# Patient Record
Sex: FEMALE | Race: WHITE | URBAN - NONMETROPOLITAN AREA
[De-identification: names, ages, dates, MRNs, and addresses within clinical notes are randomized per-mention and may not be internally consistent; named-entity substitution may affect disease eponyms.]

---

## 2020-10-20 ENCOUNTER — OFFICE VISIT (OUTPATIENT)
Dept: URBAN - NONMETROPOLITAN AREA CLINIC 2 | Facility: CLINIC | Age: 60
End: 2020-10-20
Payer: COMMERCIAL

## 2020-10-20 ENCOUNTER — LAB OUTSIDE AN ENCOUNTER (OUTPATIENT)
Dept: URBAN - NONMETROPOLITAN AREA CLINIC 2 | Facility: CLINIC | Age: 60
End: 2020-10-20

## 2020-10-20 DIAGNOSIS — B19.20 HEPATITIS C VIRUS INFECTION WITHOUT HEPATIC COMA, UNSPECIFIED CHRONICITY: ICD-10-CM

## 2020-10-20 DIAGNOSIS — Z12.11 SCREENING FOR COLON CANCER: ICD-10-CM

## 2020-10-20 PROCEDURE — G8427 DOCREV CUR MEDS BY ELIG CLIN: HCPCS | Performed by: INTERNAL MEDICINE

## 2020-10-20 PROCEDURE — 3017F COLORECTAL CA SCREEN DOC REV: CPT | Performed by: INTERNAL MEDICINE

## 2020-10-20 PROCEDURE — G9902 PT SCRN TBCO AND ID AS USER: HCPCS | Performed by: INTERNAL MEDICINE

## 2020-10-20 PROCEDURE — 80074 ACUTE HEPATITIS PANEL: CPT | Performed by: INTERNAL MEDICINE

## 2020-10-20 PROCEDURE — 99203 OFFICE O/P NEW LOW 30 MIN: CPT | Performed by: INTERNAL MEDICINE

## 2020-10-20 PROCEDURE — 87522 HEPATITIS C REVRS TRNSCRPJ: CPT | Performed by: INTERNAL MEDICINE

## 2020-10-20 RX ORDER — TRAZODONE HYDROCHLORIDE 100 MG/1
TAKE 1 TABLET (100 MG) BY ORAL ROUTE ONCE DAILY AT BEDTIME TABLET, FILM COATED ORAL 1
Qty: 0 | Refills: 0 | Status: ACTIVE | COMMUNITY
Start: 1900-01-01

## 2020-10-20 RX ORDER — OMEPRAZOLE 20 MG/1
TAKE 1 CAPSULE (20 MG) BY ORAL ROUTE ONCE DAILY BEFORE A MEAL CAPSULE, DELAYED RELEASE ORAL 1
Qty: 0 | Refills: 0 | Status: ACTIVE | COMMUNITY
Start: 1900-01-01

## 2020-10-20 RX ORDER — EPHEDRINE HCL, GUAIFENESIN 12.5; 2 MG/1; MG/1
TAKE 1 TABLET BY ORAL ROUTE DAILY TABLET ORAL 1
Qty: 0 | Refills: 0 | Status: ACTIVE | COMMUNITY
Start: 1900-01-01

## 2020-10-20 RX ORDER — ATENOLOL 25 MG/1
TAKE 1 TABLET (25 MG) BY ORAL ROUTE ONCE DAILY TABLET ORAL 1
Qty: 0 | Refills: 0 | Status: ACTIVE | COMMUNITY
Start: 1900-01-01

## 2020-10-20 RX ORDER — FLUTICASONE PROPIONATE 50 UG/1
SPRAY, METERED NASAL
Qty: 0 | Refills: 0 | Status: ACTIVE | COMMUNITY
Start: 1900-01-01

## 2020-10-20 RX ORDER — HYDROCODONE/ACETAMINOPHEN 10MG-325MG
TAKE 1 TABLET BY ORAL ROUTE EVERY 6 HOURS AS NEEDED FOR PAIN TABLET ORAL
Qty: 0 | Refills: 0 | Status: ACTIVE | COMMUNITY
Start: 1900-01-01

## 2020-10-20 RX ORDER — ASPIRIN 81 MG/1
CHEW 1 TABLET (81 MG) BY ORAL ROUTE ONCE DAILY TABLET, CHEWABLE ORAL 1
Qty: 0 | Refills: 0 | Status: ACTIVE | COMMUNITY
Start: 1900-01-01

## 2020-10-20 RX ORDER — DIPHENHYDRAMINE HCL 2 %
TAKE 2 CAPSULES (50 MG) BY ORAL ROUTE ONCE DAILY AT BEDTIME AS NEEDED CREAM (GRAM) TOPICAL 1
Qty: 0 | Refills: 0 | Status: ACTIVE | COMMUNITY
Start: 1900-01-01

## 2020-10-20 NOTE — HPI-TODAY'S VISIT:
The patient is a 60-year-old female who presents today for hepatitis C.  She was diagnosed with hepatitis C by her primary care physician.  She does have a history of hepatitis B, she states about 40 years ago she did have jaundice and was diagnosed with hepatitis B.  She has not had any problems with her liver that she knows of since that time.  She does drink alcohol occasionally, and she does smoke a pack of cigarettes per day.  She has done IV drugs in the past, but this has been quite some time.  She is currently on disability secondary to her back.  She has never had a colonoscopy.  She denies any problems with her bowels.  She has not seen any blood in her stool.  We have had a long discussion today about hepatitis C and screening for colon cancer.

## 2020-10-20 NOTE — PHYSICAL EXAM GASTROINTESTINAL
Abdomen , soft, nontender, nondistended , normal bowel sounds Spontaneous, unlabored and symmetrical

## 2020-10-22 LAB
ALBUMIN: 4.3
ALKALINE PHOSPHATASE: 87
ALT (SGPT): 28
AST (SGOT): 68
BASO (ABSOLUTE): 0
BASOS: 0
BILIRUBIN, DIRECT: 0.18
BILIRUBIN, TOTAL: 0.4
BUN/CREATININE RATIO: 9
BUN: 5
CARBON DIOXIDE, TOTAL: 22
CHLORIDE: 91
CREATININE: 0.55
EGFR IF AFRICN AM: 117
EGFR IF NONAFRICN AM: 102
EOS (ABSOLUTE): 0
EOS: 0
GLUCOSE: 74
HBSAG SCREEN: NEGATIVE
HCV GENOTYPE: (no result)
HCV LOG10: 6.66
HCV LOG10: 6.66
HEMATOCRIT: 35.9
HEMATOLOGY COMMENTS:: (no result)
HEMOGLOBIN: 12.2
HEP A AB, IGM: NEGATIVE
HEP B CORE AB, IGM: NEGATIVE
HEP B CORE AB, TOT: POSITIVE
HEP C VIRUS AB: >11
HEPATITIS C GENOTYPE: (no result)
HEPATITIS C QUANTITATION: (no result)
HEPATITIS C QUANTITATION: (no result)
IMMATURE CELLS: (no result)
IMMATURE GRANS (ABS): 0
IMMATURE GRANULOCYTES: 0
LYMPHS (ABSOLUTE): 0.6
LYMPHS: 10
Lab: (no result)
Lab: (no result)
MCH: 35.5
MCHC: 34
MCV: 104
MONOCYTES(ABSOLUTE): 0.6
MONOCYTES: 10
NEUTROPHILS (ABSOLUTE): 4.9
NEUTROPHILS: 80
NRBC: (no result)
PLATELETS: 489
POTASSIUM: 5
PROTEIN, TOTAL: 7.6
RBC: 3.44
RDW: 12.2
SODIUM: 130
TEST INFORMATION:: (no result)
TEST INFORMATION:: (no result)
WBC: 6.2

## 2020-10-28 ENCOUNTER — TELEPHONE ENCOUNTER (OUTPATIENT)
Dept: URBAN - METROPOLITAN AREA CLINIC 92 | Facility: CLINIC | Age: 60
End: 2020-10-28

## 2021-01-19 ENCOUNTER — OFFICE VISIT (OUTPATIENT)
Dept: URBAN - NONMETROPOLITAN AREA CLINIC 2 | Facility: CLINIC | Age: 61
End: 2021-01-19

## 2021-03-04 ENCOUNTER — OFFICE VISIT (OUTPATIENT)
Dept: URBAN - NONMETROPOLITAN AREA CLINIC 2 | Facility: CLINIC | Age: 61
End: 2021-03-04

## 2021-03-16 ENCOUNTER — OFFICE VISIT (OUTPATIENT)
Dept: URBAN - NONMETROPOLITAN AREA CLINIC 2 | Facility: CLINIC | Age: 61
End: 2021-03-16

## 2021-04-12 ENCOUNTER — TELEPHONE ENCOUNTER (OUTPATIENT)
Dept: URBAN - METROPOLITAN AREA CLINIC 92 | Facility: CLINIC | Age: 61
End: 2021-04-12

## 2021-04-12 RX ORDER — VELPATASVIR AND SOFOSBUVIR 100; 400 MG/1; MG/1
1 TABLET TABLET, FILM COATED ORAL ONCE A DAY
Qty: 30 TABLET | Refills: 2 | OUTPATIENT
Start: 2021-04-20 | End: 2021-07-19

## 2021-04-12 RX ORDER — TRAZODONE HYDROCHLORIDE 100 MG/1
TAKE 1 TABLET (100 MG) BY ORAL ROUTE ONCE DAILY AT BEDTIME TABLET, FILM COATED ORAL 1
Qty: 0 | Refills: 0 | Status: ACTIVE | COMMUNITY
Start: 1900-01-01

## 2021-04-12 RX ORDER — DIPHENHYDRAMINE HCL 2 %
TAKE 2 CAPSULES (50 MG) BY ORAL ROUTE ONCE DAILY AT BEDTIME AS NEEDED CREAM (GRAM) TOPICAL 1
Qty: 0 | Refills: 0 | Status: ACTIVE | COMMUNITY
Start: 1900-01-01

## 2021-04-12 RX ORDER — OMEPRAZOLE 20 MG/1
TAKE 1 CAPSULE (20 MG) BY ORAL ROUTE ONCE DAILY BEFORE A MEAL CAPSULE, DELAYED RELEASE ORAL 1
Qty: 0 | Refills: 0 | Status: ACTIVE | COMMUNITY
Start: 1900-01-01

## 2021-04-12 RX ORDER — FLUTICASONE PROPIONATE 50 UG/1
SPRAY, METERED NASAL
Qty: 0 | Refills: 0 | Status: ACTIVE | COMMUNITY
Start: 1900-01-01

## 2021-04-12 RX ORDER — ATENOLOL 25 MG/1
TAKE 1 TABLET (25 MG) BY ORAL ROUTE ONCE DAILY TABLET ORAL 1
Qty: 0 | Refills: 0 | Status: ACTIVE | COMMUNITY
Start: 1900-01-01

## 2021-04-12 RX ORDER — EPHEDRINE HCL, GUAIFENESIN 12.5; 2 MG/1; MG/1
TAKE 1 TABLET BY ORAL ROUTE DAILY TABLET ORAL 1
Qty: 0 | Refills: 0 | Status: ACTIVE | COMMUNITY
Start: 1900-01-01

## 2021-04-12 RX ORDER — HYDROCODONE/ACETAMINOPHEN 10MG-325MG
TAKE 1 TABLET BY ORAL ROUTE EVERY 6 HOURS AS NEEDED FOR PAIN TABLET ORAL
Qty: 0 | Refills: 0 | Status: ACTIVE | COMMUNITY
Start: 1900-01-01

## 2021-04-12 RX ORDER — ASPIRIN 81 MG/1
CHEW 1 TABLET (81 MG) BY ORAL ROUTE ONCE DAILY TABLET, CHEWABLE ORAL 1
Qty: 0 | Refills: 0 | Status: ACTIVE | COMMUNITY
Start: 1900-01-01

## 2021-05-11 ENCOUNTER — OFFICE VISIT (OUTPATIENT)
Dept: URBAN - NONMETROPOLITAN AREA CLINIC 2 | Facility: CLINIC | Age: 61
End: 2021-05-11
Payer: COMMERCIAL

## 2021-05-11 ENCOUNTER — WEB ENCOUNTER (OUTPATIENT)
Dept: URBAN - NONMETROPOLITAN AREA CLINIC 2 | Facility: CLINIC | Age: 61
End: 2021-05-11

## 2021-05-11 VITALS
HEIGHT: 62 IN | HEART RATE: 103 BPM | WEIGHT: 96 LBS | TEMPERATURE: 96.9 F | SYSTOLIC BLOOD PRESSURE: 126 MMHG | BODY MASS INDEX: 17.66 KG/M2 | DIASTOLIC BLOOD PRESSURE: 83 MMHG

## 2021-05-11 DIAGNOSIS — B19.20 HEPATITIS C VIRUS INFECTION WITHOUT HEPATIC COMA, UNSPECIFIED CHRONICITY: ICD-10-CM

## 2021-05-11 DIAGNOSIS — B19.10 VIRAL HEPATITIS B WITH HEPATITIS DELTA, WITHOUT HEPATIC COMA, UNSPECIFIED CHRONICITY: ICD-10-CM

## 2021-05-11 DIAGNOSIS — Z12.11 SCREENING FOR COLON CANCER: ICD-10-CM

## 2021-05-11 DIAGNOSIS — N04.9 NEPHROTIC SYNDROME: ICD-10-CM

## 2021-05-11 PROCEDURE — 99214 OFFICE O/P EST MOD 30 MIN: CPT | Performed by: INTERNAL MEDICINE

## 2021-05-11 RX ORDER — FLUTICASONE PROPIONATE 50 UG/1
SPRAY, METERED NASAL
Qty: 0 | Refills: 0 | Status: ACTIVE | COMMUNITY
Start: 1900-01-01

## 2021-05-11 RX ORDER — OMEPRAZOLE 20 MG/1
TAKE 1 CAPSULE (20 MG) BY ORAL ROUTE ONCE DAILY BEFORE A MEAL CAPSULE, DELAYED RELEASE ORAL 1
Qty: 0 | Refills: 0 | Status: ACTIVE | COMMUNITY
Start: 1900-01-01

## 2021-05-11 RX ORDER — VELPATASVIR AND SOFOSBUVIR 100; 400 MG/1; MG/1
1 TABLET TABLET, FILM COATED ORAL ONCE A DAY
Qty: 30 TABLET | Refills: 2 | Status: ACTIVE | COMMUNITY
Start: 2021-04-20 | End: 2021-07-19

## 2021-05-11 RX ORDER — TRAZODONE HYDROCHLORIDE 100 MG/1
TAKE 1 TABLET (100 MG) BY ORAL ROUTE ONCE DAILY AT BEDTIME TABLET, FILM COATED ORAL 1
Qty: 0 | Refills: 0 | Status: ACTIVE | COMMUNITY
Start: 1900-01-01

## 2021-05-11 RX ORDER — EPHEDRINE HCL, GUAIFENESIN 12.5; 2 MG/1; MG/1
TAKE 1 TABLET BY ORAL ROUTE DAILY TABLET ORAL 1
Qty: 0 | Refills: 0 | Status: ACTIVE | COMMUNITY
Start: 1900-01-01

## 2021-05-11 RX ORDER — HYDROCODONE/ACETAMINOPHEN 10MG-325MG
TAKE 1 TABLET BY ORAL ROUTE EVERY 6 HOURS AS NEEDED FOR PAIN TABLET ORAL
Qty: 0 | Refills: 0 | Status: ACTIVE | COMMUNITY
Start: 1900-01-01

## 2021-05-11 RX ORDER — ATENOLOL 25 MG/1
TAKE 1 TABLET (25 MG) BY ORAL ROUTE ONCE DAILY TABLET ORAL 1
Qty: 0 | Refills: 0 | Status: ACTIVE | COMMUNITY
Start: 1900-01-01

## 2021-05-11 RX ORDER — ASPIRIN 81 MG/1
CHEW 1 TABLET (81 MG) BY ORAL ROUTE ONCE DAILY TABLET, CHEWABLE ORAL 1
Qty: 0 | Refills: 0 | Status: ACTIVE | COMMUNITY
Start: 1900-01-01

## 2021-05-11 RX ORDER — DIPHENHYDRAMINE HCL 2 %
TAKE 2 CAPSULES (50 MG) BY ORAL ROUTE ONCE DAILY AT BEDTIME AS NEEDED CREAM (GRAM) TOPICAL 1
Qty: 0 | Refills: 0 | Status: ACTIVE | COMMUNITY
Start: 1900-01-01

## 2021-05-11 NOTE — HPI-TODAY'S VISIT:
The patient is a 60-year-old female who presents today for hepatitis C.  She was diagnosed with hepatitis C by her primary care physician.  She does have a history of hepatitis B, she states about 40 years ago she did have jaundice and was diagnosed with hepatitis B.  She has not had any problems with her liver that she knows of since that time.  She does drink alcohol occasionally, and she does smoke a pack of cigarettes per day.  She has done IV drugs in the past, but this has been quite some time.  She is currently on disability secondary to her back.  She has never had a colonoscopy.  She denies any problems with her bowels.  She has not seen any blood in her stool.  We have had a long discussion today about hepatitis C and screening for colon cancer.  5/11/2021 Mrs. Maurer presents for [facility] follow-up.  She was admitted with nephrotic syndrome, she had severe abnormalities of her electrolytes and is following closely with nephrology.  Her viral load is positive for hepatitis C and she is genotype 1a.  Her viral load for hepatitis B is negative, she did have acute hep B over 40 years ago.  We have discussed therapy with Epclusa for 12 weeks.  However given her acute kidney injury and electrolyte imbalance we have decided to defer therapy until later this summer.  We will get records from Dr. Dill and consider Epclusa for 12 weeks so long as she has significant improvement in her renal function.  MB

## 2021-05-16 LAB
A/G RATIO: 1.3
ALBUMIN: 3.9
ALKALINE PHOSPHATASE: 82
ALT (SGPT): 20
AST (SGOT): 32
BASO (ABSOLUTE): 0.1
BASOS: 0
BILIRUBIN, TOTAL: 0.2
BUN/CREATININE RATIO: 18
BUN: 9
CALCIUM: 9.1
CARBON DIOXIDE, TOTAL: 19
CHLORIDE: 94
CREATININE: 0.5
EGFR IF AFRICN AM: 120
EGFR IF NONAFRICN AM: 104
EOS (ABSOLUTE): 0
EOS: 0
GLOBULIN, TOTAL: 2.9
GLUCOSE: 77
HBSAG SCREEN: NEGATIVE
HBV LOG10: (no result)
HCV AB: >11
HCV LOG10: 6.09
HEMATOCRIT: 26.6
HEMATOLOGY COMMENTS:: (no result)
HEMOGLOBIN: 8.6
HEP A AB, IGM: NEGATIVE
HEP A AB, TOTAL: POSITIVE
HEP B CORE AB, IGM: NEGATIVE
HEP B CORE AB, TOT: POSITIVE
HEP BE AB: NEGATIVE
HEP BE AG: NEGATIVE
HEP C VIRUS AB: >11
HEPATITIS B QUANTITATION: (no result)
HEPATITIS B SURF AB QUANT: 337.6
HEPATITIS C QUANTITATION: (no result)
IMMATURE CELLS: (no result)
IMMATURE GRANS (ABS): 0
IMMATURE GRANULOCYTES: 0
INTERPRETATION:: (no result)
LYMPHS (ABSOLUTE): 0.8
LYMPHS: 7
MCH: 33.2
MCHC: 32.3
MCV: 103
MONOCYTES(ABSOLUTE): 1.4
MONOCYTES: 11
NEUTROPHILS (ABSOLUTE): 9.8
NEUTROPHILS: 82
NRBC: (no result)
PLATELETS: 441
POTASSIUM: 5.4
PROTEIN, TOTAL: 6.8
RBC: 2.59
RDW: 13.2
SODIUM: 129
TEST INFORMATION:: (no result)
TEST INFORMATION:: (no result)
WBC: 12.1

## 2021-05-17 ENCOUNTER — TELEPHONE ENCOUNTER (OUTPATIENT)
Dept: URBAN - METROPOLITAN AREA CLINIC 92 | Facility: CLINIC | Age: 61
End: 2021-05-17

## 2021-05-17 RX ORDER — ASPIRIN 81 MG/1
CHEW 1 TABLET (81 MG) BY ORAL ROUTE ONCE DAILY TABLET, CHEWABLE ORAL 1
Qty: 0 | Refills: 0 | Status: ACTIVE | COMMUNITY
Start: 1900-01-01

## 2021-05-17 RX ORDER — HYDROCODONE/ACETAMINOPHEN 10MG-325MG
TAKE 1 TABLET BY ORAL ROUTE EVERY 6 HOURS AS NEEDED FOR PAIN TABLET ORAL
Qty: 0 | Refills: 0 | Status: ACTIVE | COMMUNITY
Start: 1900-01-01

## 2021-05-17 RX ORDER — DIPHENHYDRAMINE HCL 2 %
TAKE 2 CAPSULES (50 MG) BY ORAL ROUTE ONCE DAILY AT BEDTIME AS NEEDED CREAM (GRAM) TOPICAL 1
Qty: 0 | Refills: 0 | Status: ACTIVE | COMMUNITY
Start: 1900-01-01

## 2021-05-17 RX ORDER — EPHEDRINE HCL, GUAIFENESIN 12.5; 2 MG/1; MG/1
TAKE 1 TABLET BY ORAL ROUTE DAILY TABLET ORAL 1
Qty: 0 | Refills: 0 | Status: ACTIVE | COMMUNITY
Start: 1900-01-01

## 2021-05-17 RX ORDER — VELPATASVIR AND SOFOSBUVIR 100; 400 MG/1; MG/1
1 TABLET TABLET, FILM COATED ORAL ONCE A DAY
Qty: 30 TABLET | Refills: 2 | Status: ACTIVE | COMMUNITY
Start: 2021-04-20 | End: 2021-07-19

## 2021-05-17 RX ORDER — TRAZODONE HYDROCHLORIDE 100 MG/1
TAKE 1 TABLET (100 MG) BY ORAL ROUTE ONCE DAILY AT BEDTIME TABLET, FILM COATED ORAL 1
Qty: 0 | Refills: 0 | Status: ACTIVE | COMMUNITY
Start: 1900-01-01

## 2021-05-17 RX ORDER — POLYETHYLENE GLYCOL 3350, SODIUM SULFATE ANHYDROUS, SODIUM BICARBONATE, SODIUM CHLORIDE, POTASSIUM CHLORIDE 236; 22.74; 6.74; 5.86; 2.97 G/4L; G/4L; G/4L; G/4L; G/4L
AS DIRECTED POWDER, FOR SOLUTION ORAL ONCE
Qty: 1 GALLON | Refills: 0 | OUTPATIENT
Start: 2021-05-19 | End: 2021-05-20

## 2021-05-17 RX ORDER — OMEPRAZOLE 20 MG/1
TAKE 1 CAPSULE (20 MG) BY ORAL ROUTE ONCE DAILY BEFORE A MEAL CAPSULE, DELAYED RELEASE ORAL 1
Qty: 0 | Refills: 0 | Status: ACTIVE | COMMUNITY
Start: 1900-01-01

## 2021-05-17 RX ORDER — ATENOLOL 25 MG/1
TAKE 1 TABLET (25 MG) BY ORAL ROUTE ONCE DAILY TABLET ORAL 1
Qty: 0 | Refills: 0 | Status: ACTIVE | COMMUNITY
Start: 1900-01-01

## 2021-05-17 RX ORDER — FLUTICASONE PROPIONATE 50 UG/1
SPRAY, METERED NASAL
Qty: 0 | Refills: 0 | Status: ACTIVE | COMMUNITY
Start: 1900-01-01

## 2021-05-19 ENCOUNTER — TELEPHONE ENCOUNTER (OUTPATIENT)
Dept: URBAN - NONMETROPOLITAN AREA CLINIC 2 | Facility: CLINIC | Age: 61
End: 2021-05-19

## 2021-05-19 ENCOUNTER — LAB OUTSIDE AN ENCOUNTER (OUTPATIENT)
Dept: URBAN - METROPOLITAN AREA CLINIC 92 | Facility: CLINIC | Age: 61
End: 2021-05-19

## 2021-05-19 RX ORDER — OMEPRAZOLE 20 MG/1
TAKE 1 CAPSULE (20 MG) BY ORAL ROUTE ONCE DAILY BEFORE A MEAL CAPSULE, DELAYED RELEASE ORAL 1
Qty: 0 | Refills: 0 | Status: ACTIVE | COMMUNITY
Start: 1900-01-01

## 2021-05-19 RX ORDER — POLYETHYLENE GLYCOL 3350, SODIUM SULFATE, SODIUM CHLORIDE, POTASSIUM CHLORIDE, ASCORBIC ACID, SODIUM ASCORBATE 7.5-2.691G
AS DIRECTED KIT ORAL ONCE
Qty: 1 BOX | Refills: 0 | OUTPATIENT
Start: 2021-05-20 | End: 2021-05-21

## 2021-05-19 RX ORDER — ASPIRIN 81 MG/1
CHEW 1 TABLET (81 MG) BY ORAL ROUTE ONCE DAILY TABLET, CHEWABLE ORAL 1
Qty: 0 | Refills: 0 | Status: ACTIVE | COMMUNITY
Start: 1900-01-01

## 2021-05-19 RX ORDER — DIPHENHYDRAMINE HCL 2 %
TAKE 2 CAPSULES (50 MG) BY ORAL ROUTE ONCE DAILY AT BEDTIME AS NEEDED CREAM (GRAM) TOPICAL 1
Qty: 0 | Refills: 0 | Status: ACTIVE | COMMUNITY
Start: 1900-01-01

## 2021-05-19 RX ORDER — FLUTICASONE PROPIONATE 50 UG/1
SPRAY, METERED NASAL
Qty: 0 | Refills: 0 | Status: ACTIVE | COMMUNITY
Start: 1900-01-01

## 2021-05-19 RX ORDER — HYDROCODONE/ACETAMINOPHEN 10MG-325MG
TAKE 1 TABLET BY ORAL ROUTE EVERY 6 HOURS AS NEEDED FOR PAIN TABLET ORAL
Qty: 0 | Refills: 0 | Status: ACTIVE | COMMUNITY
Start: 1900-01-01

## 2021-05-19 RX ORDER — EPHEDRINE HCL, GUAIFENESIN 12.5; 2 MG/1; MG/1
TAKE 1 TABLET BY ORAL ROUTE DAILY TABLET ORAL 1
Qty: 0 | Refills: 0 | Status: ACTIVE | COMMUNITY
Start: 1900-01-01

## 2021-05-19 RX ORDER — POLYETHYLENE GLYCOL 3350, SODIUM SULFATE ANHYDROUS, SODIUM BICARBONATE, SODIUM CHLORIDE, POTASSIUM CHLORIDE 236; 22.74; 6.74; 5.86; 2.97 G/4L; G/4L; G/4L; G/4L; G/4L
AS DIRECTED POWDER, FOR SOLUTION ORAL ONCE
Qty: 1 GALLON | Refills: 0 | Status: ACTIVE | COMMUNITY
Start: 2021-05-19 | End: 2021-05-20

## 2021-05-19 RX ORDER — TRAZODONE HYDROCHLORIDE 100 MG/1
TAKE 1 TABLET (100 MG) BY ORAL ROUTE ONCE DAILY AT BEDTIME TABLET, FILM COATED ORAL 1
Qty: 0 | Refills: 0 | Status: ACTIVE | COMMUNITY
Start: 1900-01-01

## 2021-05-19 RX ORDER — ATENOLOL 25 MG/1
TAKE 1 TABLET (25 MG) BY ORAL ROUTE ONCE DAILY TABLET ORAL 1
Qty: 0 | Refills: 0 | Status: ACTIVE | COMMUNITY
Start: 1900-01-01

## 2021-05-19 RX ORDER — VELPATASVIR AND SOFOSBUVIR 100; 400 MG/1; MG/1
1 TABLET TABLET, FILM COATED ORAL ONCE A DAY
Qty: 30 TABLET | Refills: 2 | Status: ACTIVE | COMMUNITY
Start: 2021-04-20 | End: 2021-07-19

## 2021-07-08 ENCOUNTER — TELEPHONE ENCOUNTER (OUTPATIENT)
Dept: URBAN - NONMETROPOLITAN AREA CLINIC 2 | Facility: CLINIC | Age: 61
End: 2021-07-08

## 2021-07-15 ENCOUNTER — OFFICE VISIT (OUTPATIENT)
Dept: URBAN - METROPOLITAN AREA MEDICAL CENTER 1 | Facility: MEDICAL CENTER | Age: 61
End: 2021-07-15
Payer: COMMERCIAL

## 2021-07-15 DIAGNOSIS — Z12.11 COLON CANCER SCREENING: ICD-10-CM

## 2021-07-15 PROCEDURE — 45378 DIAGNOSTIC COLONOSCOPY: CPT | Performed by: INTERNAL MEDICINE

## 2021-08-10 ENCOUNTER — OFFICE VISIT (OUTPATIENT)
Dept: URBAN - NONMETROPOLITAN AREA CLINIC 2 | Facility: CLINIC | Age: 61
End: 2021-08-10

## 2021-08-11 ENCOUNTER — DASHBOARD ENCOUNTERS (OUTPATIENT)
Age: 61
End: 2021-08-11

## 2021-08-11 ENCOUNTER — OFFICE VISIT (OUTPATIENT)
Dept: URBAN - NONMETROPOLITAN AREA CLINIC 2 | Facility: CLINIC | Age: 61
End: 2021-08-11
Payer: COMMERCIAL

## 2021-08-11 ENCOUNTER — LAB OUTSIDE AN ENCOUNTER (OUTPATIENT)
Dept: URBAN - NONMETROPOLITAN AREA CLINIC 2 | Facility: CLINIC | Age: 61
End: 2021-08-11

## 2021-08-11 DIAGNOSIS — B18.2 CHRONIC HEPATITIS C: ICD-10-CM

## 2021-08-11 DIAGNOSIS — N04.9 NEPHROTIC SYNDROME: ICD-10-CM

## 2021-08-11 DIAGNOSIS — D50.8 OTHER IRON DEFICIENCY ANEMIA: ICD-10-CM

## 2021-08-11 DIAGNOSIS — B18.1 CHRONIC HEPATITIS B: ICD-10-CM

## 2021-08-11 PROBLEM — 52254009: Status: ACTIVE | Noted: 2021-05-11

## 2021-08-11 PROBLEM — 50711007: Status: ACTIVE | Noted: 2020-10-20

## 2021-08-11 PROBLEM — 87522002: Status: ACTIVE | Noted: 2021-05-19

## 2021-08-11 PROBLEM — 111891008: Status: ACTIVE | Noted: 2021-05-11

## 2021-08-11 PROBLEM — 305058001: Status: ACTIVE | Noted: 2020-10-20

## 2021-08-11 PROCEDURE — 99214 OFFICE O/P EST MOD 30 MIN: CPT | Performed by: NURSE PRACTITIONER

## 2021-08-11 RX ORDER — OMEPRAZOLE 20 MG/1
TAKE 1 CAPSULE (20 MG) BY ORAL ROUTE ONCE DAILY BEFORE A MEAL CAPSULE, DELAYED RELEASE ORAL 1
Qty: 0 | Refills: 0 | Status: ACTIVE | COMMUNITY
Start: 1900-01-01

## 2021-08-11 RX ORDER — ATENOLOL 25 MG/1
TAKE 1 TABLET (25 MG) BY ORAL ROUTE ONCE DAILY TABLET ORAL 1
Qty: 0 | Refills: 0 | Status: ACTIVE | COMMUNITY
Start: 1900-01-01

## 2021-08-11 RX ORDER — DIPHENHYDRAMINE HCL 2 %
TAKE 2 CAPSULES (50 MG) BY ORAL ROUTE ONCE DAILY AT BEDTIME AS NEEDED CREAM (GRAM) TOPICAL 1
Qty: 0 | Refills: 0 | Status: ACTIVE | COMMUNITY
Start: 1900-01-01

## 2021-08-11 RX ORDER — HYDROCODONE/ACETAMINOPHEN 10MG-325MG
TAKE 1 TABLET BY ORAL ROUTE EVERY 6 HOURS AS NEEDED FOR PAIN TABLET ORAL
Qty: 0 | Refills: 0 | Status: ACTIVE | COMMUNITY
Start: 1900-01-01

## 2021-08-11 RX ORDER — TRAZODONE HYDROCHLORIDE 100 MG/1
TAKE 1 TABLET (100 MG) BY ORAL ROUTE ONCE DAILY AT BEDTIME TABLET, FILM COATED ORAL 1
Qty: 0 | Refills: 0 | Status: ACTIVE | COMMUNITY
Start: 1900-01-01

## 2021-08-11 RX ORDER — ASPIRIN 81 MG/1
CHEW 1 TABLET (81 MG) BY ORAL ROUTE ONCE DAILY TABLET, CHEWABLE ORAL 1
Qty: 0 | Refills: 0 | Status: ACTIVE | COMMUNITY
Start: 1900-01-01

## 2021-08-11 RX ORDER — FLUTICASONE PROPIONATE 50 UG/1
SPRAY, METERED NASAL
Qty: 0 | Refills: 0 | Status: ACTIVE | COMMUNITY
Start: 1900-01-01

## 2021-08-11 RX ORDER — EPHEDRINE HCL, GUAIFENESIN 12.5; 2 MG/1; MG/1
TAKE 1 TABLET BY ORAL ROUTE DAILY TABLET ORAL 1
Qty: 0 | Refills: 0 | Status: ACTIVE | COMMUNITY
Start: 1900-01-01

## 2021-08-11 NOTE — HPI-TODAY'S VISIT:
The patient is a 60-year-old female who presents today for hepatitis C.  She was diagnosed with hepatitis C by her primary care physician.  She does have a history of hepatitis B, she states about 40 years ago she did have jaundice and was diagnosed with hepatitis B.  She has not had any problems with her liver that she knows of since that time.  She does drink alcohol occasionally, and she does smoke a pack of cigarettes per day.  She has done IV drugs in the past, but this has been quite some time.  She is currently on disability secondary to her back.  She has never had a colonoscopy.  She denies any problems with her bowels.  She has not seen any blood in her stool.  We have had a long discussion today about hepatitis C and screening for colon cancer.  5/11/2021 Mrs. Maurer presents for [facility] follow-up.  She was admitted with nephrotic syndrome, she had severe abnormalities of her electrolytes and is following closely with nephrology.  Her viral load is positive for hepatitis C and she is genotype 1a.  Her viral load for hepatitis B is negative, she did have acute hep B over 40 years ago.  We have discussed therapy with Epclusa for 12 weeks.  However given her acute kidney injury and electrolyte imbalance we have decided to defer therapy until later this summer.  We will get records from Dr. Dill and consider Epclusa for 12 weeks so long as she has significant improvement in her renal function.  MB   8/11/2021 Mrs. Maurer presents for follow-up of hepatitis C genotype 1a noncirrhotic treatment naive, distant history of hepatitis B with negative viral load, nephrotic syndrome, and anemia.  Since her last visit she is status post colonoscopy which was normal.  She continues to follow with  but we do not have these records.  She has been diagnosed with nephrotic syndrome.  She does have a likely component of CKD, and is on multiple doses of diuretics daily.  Her electrolytes continue to waver.  Today we have had a long discussion regarding her renal disease, history of hepatitis B with a positive core antibody, and hepatitis C.  She does want to pursue treatment.  She agrees to be evaluated by hepatology.  MB

## 2021-08-15 LAB
A/G RATIO: 1.2
ALBUMIN: 4.6
ALKALINE PHOSPHATASE: 174
ALT (SGPT): 27
AST (SGOT): 70
BASO (ABSOLUTE): 0.1
BASOS: 1
BILIRUBIN, TOTAL: 0.3
BUN/CREATININE RATIO: 15
BUN: 11
CALCIUM: 10.1
CARBON DIOXIDE, TOTAL: 25
CHLORIDE: 87
CREATININE: 0.72
EGFR IF AFRICN AM: 104
EGFR IF NONAFRICN AM: 90
EOS (ABSOLUTE): 0.2
EOS: 2
GLOBULIN, TOTAL: 4
GLUCOSE: 95
HBSAG SCREEN: NEGATIVE
HBV LOG10: (no result)
HEMATOCRIT: 38.9
HEMATOLOGY COMMENTS:: (no result)
HEMOGLOBIN: 13.1
HEP B CORE AB, TOT: POSITIVE
HEP BE AB: NEGATIVE
HEP BE AG: NEGATIVE
HEPATITIS B QUANTITATION: (no result)
HEPATITIS B SURF AB QUANT: 314.7
IMMATURE CELLS: (no result)
IMMATURE GRANS (ABS): 0
IMMATURE GRANULOCYTES: 0
INR: 0.9
LYMPHS (ABSOLUTE): 0.8
LYMPHS: 11
MCH: 34.8
MCHC: 33.7
MCV: 104
MONOCYTES(ABSOLUTE): 1
MONOCYTES: 14
NEUTROPHILS (ABSOLUTE): 5.1
NEUTROPHILS: 72
NRBC: (no result)
PLATELETS: 577
POTASSIUM: 4.2
PROTEIN, TOTAL: 8.6
PROTHROMBIN TIME: 9.8
RBC: 3.76
RDW: 12
SODIUM: 132
TEST INFORMATION:: (no result)
WBC: 7.1

## 2021-09-16 ENCOUNTER — LAB OUTSIDE AN ENCOUNTER (OUTPATIENT)
Dept: URBAN - NONMETROPOLITAN AREA CLINIC 2 | Facility: CLINIC | Age: 61
End: 2021-09-16

## 2021-09-26 LAB
A/G RATIO: 0.9
ALBUMIN: 3.5
ALKALINE PHOSPHATASE: 175
ALT (SGPT): 28
AST (SGOT): 59
BASO (ABSOLUTE): 0.1
BASOS: 1
BILIRUBIN, TOTAL: <0.2
BUN/CREATININE RATIO: 11
BUN: 9
CALCIUM: 9.5
CARBON DIOXIDE, TOTAL: 22
CHLORIDE: 94
CREATININE: 0.82
EGFR IF AFRICN AM: 89
EGFR IF NONAFRICN AM: 77
EOS (ABSOLUTE): 0.2
EOS: 2
GLOBULIN, TOTAL: 3.7
GLUCOSE: 103
HBSAG SCREEN: NEGATIVE
HBV LOG10: (no result)
HEMATOCRIT: 38.5
HEMATOLOGY COMMENTS:: (no result)
HEMOGLOBIN: 13.1
HEP B CORE AB, TOT: POSITIVE
HEP BE AB: NEGATIVE
HEP BE AG: NEGATIVE
HEPATITIS B QUANTITATION: (no result)
HEPATITIS B SURF AB QUANT: 236.7
IMMATURE CELLS: (no result)
IMMATURE GRANS (ABS): 0
IMMATURE GRANULOCYTES: 0
INR: 0.9
LYMPHS (ABSOLUTE): 1.2
LYMPHS: 11
MCH: 34.1
MCHC: 34
MCV: 100
MONOCYTES(ABSOLUTE): 1
MONOCYTES: 9
NEUTROPHILS (ABSOLUTE): 8.4
NEUTROPHILS: 77
NRBC: (no result)
PLATELETS: 528
POTASSIUM: 4.4
PROTEIN, TOTAL: 7.2
PROTHROMBIN TIME: 9.6
RBC: 3.84
RDW: 11.5
SODIUM: 135
TEST INFORMATION:: (no result)
WBC: 10.8

## 2021-09-27 ENCOUNTER — TELEPHONE ENCOUNTER (OUTPATIENT)
Dept: URBAN - METROPOLITAN AREA CLINIC 92 | Facility: CLINIC | Age: 61
End: 2021-09-27

## 2021-11-01 ENCOUNTER — OUT OF OFFICE VISIT (OUTPATIENT)
Dept: URBAN - METROPOLITAN AREA MEDICAL CENTER 1 | Facility: MEDICAL CENTER | Age: 61
End: 2021-11-01
Payer: COMMERCIAL

## 2021-11-01 DIAGNOSIS — B18.2 CARRIER OF VIRAL HEPATITIS C: ICD-10-CM

## 2021-11-01 DIAGNOSIS — R19.7 ACUTE DIARRHEA: ICD-10-CM

## 2021-11-01 DIAGNOSIS — J90 PLEURAL EFFUSION, RIGHT: ICD-10-CM

## 2021-11-01 DIAGNOSIS — B18.1 CARRIER OF HEPATITIS B: ICD-10-CM

## 2021-11-01 PROCEDURE — 99233 SBSQ HOSP IP/OBS HIGH 50: CPT | Performed by: PHYSICIAN ASSISTANT

## 2021-11-11 ENCOUNTER — OFFICE VISIT (OUTPATIENT)
Dept: URBAN - NONMETROPOLITAN AREA CLINIC 2 | Facility: CLINIC | Age: 61
End: 2021-11-11

## 2021-12-15 ENCOUNTER — OFFICE VISIT (OUTPATIENT)
Dept: URBAN - NONMETROPOLITAN AREA CLINIC 2 | Facility: CLINIC | Age: 61
End: 2021-12-15